# Patient Record
Sex: FEMALE | Race: OTHER | NOT HISPANIC OR LATINO | Employment: OTHER | URBAN - METROPOLITAN AREA
[De-identification: names, ages, dates, MRNs, and addresses within clinical notes are randomized per-mention and may not be internally consistent; named-entity substitution may affect disease eponyms.]

---

## 2023-12-27 ENCOUNTER — APPOINTMENT (OUTPATIENT)
Dept: CARDIOLOGY | Facility: HOSPITAL | Age: 73
End: 2023-12-27
Payer: MEDICARE

## 2023-12-27 ENCOUNTER — HOSPITAL ENCOUNTER (EMERGENCY)
Facility: HOSPITAL | Age: 73
Discharge: HOME | End: 2023-12-27
Payer: MEDICARE

## 2023-12-27 VITALS
DIASTOLIC BLOOD PRESSURE: 60 MMHG | BODY MASS INDEX: 32.1 KG/M2 | TEMPERATURE: 98.1 F | WEIGHT: 170 LBS | HEIGHT: 61 IN | RESPIRATION RATE: 18 BRPM | SYSTOLIC BLOOD PRESSURE: 152 MMHG | OXYGEN SATURATION: 100 % | HEART RATE: 58 BPM

## 2023-12-27 PROCEDURE — 93005 ELECTROCARDIOGRAM TRACING: CPT

## 2023-12-27 PROCEDURE — 99283 EMERGENCY DEPT VISIT LOW MDM: CPT

## 2023-12-27 PROCEDURE — 99281 EMR DPT VST MAYX REQ PHY/QHP: CPT | Mod: 25

## 2023-12-27 ASSESSMENT — PAIN - FUNCTIONAL ASSESSMENT: PAIN_FUNCTIONAL_ASSESSMENT: 0-10

## 2023-12-27 ASSESSMENT — PAIN SCALES - GENERAL: PAINLEVEL_OUTOF10: 0 - NO PAIN

## 2023-12-27 ASSESSMENT — COLUMBIA-SUICIDE SEVERITY RATING SCALE - C-SSRS
6. HAVE YOU EVER DONE ANYTHING, STARTED TO DO ANYTHING, OR PREPARED TO DO ANYTHING TO END YOUR LIFE?: NO
1. IN THE PAST MONTH, HAVE YOU WISHED YOU WERE DEAD OR WISHED YOU COULD GO TO SLEEP AND NOT WAKE UP?: NO
2. HAVE YOU ACTUALLY HAD ANY THOUGHTS OF KILLING YOURSELF?: NO

## 2024-01-08 LAB
ATRIAL RATE: 57 BPM
P AXIS: -1 DEGREES
P OFFSET: 178 MS
P ONSET: 142 MS
PR INTERVAL: 152 MS
Q ONSET: 218 MS
QRS COUNT: 10 BEATS
QRS DURATION: 74 MS
QT INTERVAL: 388 MS
QTC CALCULATION(BAZETT): 377 MS
QTC FREDERICIA: 381 MS
R AXIS: -13 DEGREES
T AXIS: 12 DEGREES
T OFFSET: 412 MS
VENTRICULAR RATE: 57 BPM

## 2024-04-25 ENCOUNTER — TELEPHONE (OUTPATIENT)
Dept: CARDIOLOGY | Facility: CLINIC | Age: 74
End: 2024-04-25
Payer: MEDICARE

## 2024-04-25 NOTE — TELEPHONE ENCOUNTER
Pt. Is a PRN and has not been seen since 10/21.   Her son wanted her to come in but she said she felt ok but wanted to be seen.  Please Advise

## 2024-05-09 ENCOUNTER — OFFICE VISIT (OUTPATIENT)
Dept: CARDIOLOGY | Facility: CLINIC | Age: 74
End: 2024-05-09
Payer: MEDICARE

## 2024-05-09 VITALS
SYSTOLIC BLOOD PRESSURE: 144 MMHG | HEART RATE: 56 BPM | HEIGHT: 60 IN | WEIGHT: 176.9 LBS | BODY MASS INDEX: 34.73 KG/M2 | DIASTOLIC BLOOD PRESSURE: 75 MMHG | TEMPERATURE: 98.1 F

## 2024-05-09 DIAGNOSIS — R06.09 DOE (DYSPNEA ON EXERTION): ICD-10-CM

## 2024-05-09 DIAGNOSIS — I10 ESSENTIAL HYPERTENSION: ICD-10-CM

## 2024-05-09 DIAGNOSIS — E78.2 MIXED HYPERLIPIDEMIA: ICD-10-CM

## 2024-05-09 DIAGNOSIS — R00.2 PALPITATIONS: Primary | ICD-10-CM

## 2024-05-09 PROBLEM — I49.3 FREQUENT PVCS: Status: ACTIVE | Noted: 2024-05-09

## 2024-05-09 PROBLEM — I48.0 PAF (PAROXYSMAL ATRIAL FIBRILLATION) (MULTI): Status: ACTIVE | Noted: 2024-05-09

## 2024-05-09 PROCEDURE — 3077F SYST BP >= 140 MM HG: CPT | Performed by: INTERNAL MEDICINE

## 2024-05-09 PROCEDURE — 99214 OFFICE O/P EST MOD 30 MIN: CPT | Performed by: INTERNAL MEDICINE

## 2024-05-09 PROCEDURE — 1159F MED LIST DOCD IN RCRD: CPT | Performed by: INTERNAL MEDICINE

## 2024-05-09 PROCEDURE — 1160F RVW MEDS BY RX/DR IN RCRD: CPT | Performed by: INTERNAL MEDICINE

## 2024-05-09 PROCEDURE — 3078F DIAST BP <80 MM HG: CPT | Performed by: INTERNAL MEDICINE

## 2024-05-09 RX ORDER — VITAMIN E MIXED 400 UNIT
400 CAPSULE ORAL
COMMUNITY

## 2024-05-09 RX ORDER — CHOLECALCIFEROL (VITAMIN D3) 50 MCG
TABLET ORAL
COMMUNITY

## 2024-05-09 RX ORDER — ROSUVASTATIN CALCIUM 5 MG/1
5 TABLET, COATED ORAL DAILY
COMMUNITY

## 2024-05-09 RX ORDER — LEVOTHYROXINE SODIUM 50 UG/1
50 TABLET ORAL
COMMUNITY

## 2024-05-09 RX ORDER — VALSARTAN 160 MG/1
160 TABLET ORAL DAILY
COMMUNITY

## 2024-05-09 NOTE — PROGRESS NOTES
Chief Complaint:   Palpitations     History of Present Illness     Christie Quintana is a 73 y.o. female presenting with PVC's.  She has a history of frequent PVC's and AF < 1 min on Holter. She has had rare palpitations which are brief.  No AF.  She exercises and denies chest pain.  While in [place], she had BORJAS - this has resolved.  No syncope.  BP has been controlled.       Previous History     Past Medical History:  She has a past medical history of Essential hypertension (05/09/2024), Frequent PVCs (05/09/2024), Mixed hyperlipidemia (05/09/2024), PAF (paroxysmal atrial fibrillation) (Multi) (05/09/2024), Palpitations (05/09/2024), Personal history of other diseases of the circulatory system, and Personal history of other diseases of the musculoskeletal system and connective tissue.    Past Surgical History:  She has no past surgical history on file.      Social History:  She has no history on file for tobacco use, alcohol use, and drug use.    Family History:  No family history on file.     Allergies:  Latex, natural rubber and Tramadol    Outpatient Medications:  Current Outpatient Medications   Medication Instructions    cholecalciferol (Vitamin D-3) 50 MCG (2000 UT) tablet oral    Lactobacillus acidophilus 10 billion cell capsule 1 capsule, oral, Daily RT    levothyroxine (SYNTHROID, LEVOXYL) 50 mcg, oral, Daily before breakfast    multivit 46-iron-folate 6-dha 29 mg iron-1 mg -300 mg capsule 1 capsule, oral, Daily RT    rosuvastatin (CRESTOR) 5 mg, oral, Daily    valsartan (DIOVAN) 160 mg, oral, Daily    vitamin E 400 Units, oral, Daily RT       Physical Examination   Vitals:  Visit Vitals  /75 (BP Location: Right arm, Patient Position: Sitting, BP Cuff Size: Adult)   Pulse 56   Temp 36.7 °C (98.1 °F)   Ht 1.524 m (5')   Wt 80.2 kg (176 lb 14.4 oz)   BMI 34.55 kg/m²   BSA 1.84 m²      Physical Exam  Vitals reviewed.   Constitutional:       General: She is not in acute distress.     Appearance: Normal  "appearance.   HENT:      Head: Normocephalic and atraumatic.      Nose: Nose normal.   Eyes:      Conjunctiva/sclera: Conjunctivae normal.   Cardiovascular:      Rate and Rhythm: Normal rate and regular rhythm.      Pulses: Normal pulses.      Heart sounds: No murmur heard.  Pulmonary:      Effort: Pulmonary effort is normal. No respiratory distress.      Breath sounds: Normal breath sounds. No wheezing, rhonchi or rales.   Abdominal:      General: Bowel sounds are normal. There is no distension.      Palpations: Abdomen is soft.      Tenderness: There is no abdominal tenderness.   Musculoskeletal:         General: No swelling.      Right lower leg: No edema.      Left lower leg: No edema.   Skin:     General: Skin is warm and dry.      Capillary Refill: Capillary refill takes less than 2 seconds.   Neurological:      General: No focal deficit present.      Mental Status: She is alert.   Psychiatric:         Mood and Affect: Mood normal.           Labs/Imaging/Cardiac Studies     Last Labs:  CBC -  No results found for: \"WBC\", \"HGB\", \"HCT\", \"MCV\", \"PLT\"    CMP -  No results found for: \"CALCIUM\", \"PHOS\", \"PROT\", \"ALBUMIN\", \"AST\", \"ALT\", \"ALKPHOS\", \"BILITOT\"    LIPID PANEL -   No results found for: \"CHOL\", \"HDL\", \"CHHDL\", \"LDL\", \"VLDL\", \"TRIG\", \"NHDL\"    RENAL FUNCTION PANEL -   No results found for: \"GLU\", \"K\", \"CHLOR\", \"PHOS\"    Lab Results   Component Value Date    HGBA1C 5.8 12/29/2023       ECG:    Echo:  No echocardiogram results found for the past 12 months       Assessment and Recommendations     Assessment/Plan     1. Palpitations  Stable, chronic with minimal Sx's.    2. Essential hypertension  The patient's blood pressure has been well-controlled at today's appointment or by recent primary care provider's measurements/home measurements and meets their goal blood pressure per the ACC/AHA guidelines.  The patient has been compliant with their anti-hypertensive medications and is following a low sodium/DASH " diet. I advised continuation of their present medical treatment and lifestyle modification.        3. Mixed hyperlipidemia  The patient's lipids are well controlled on chronic statin therapy and they are meeting their goal LDL cholesterol per the ACC/AHA guidelines.        4. BORJAS: Will check a stress echo.         Deny Alaniz MD    Exclusive of any other services or procedures performed, I, Deny Alaniz MD , spent 30 minutes in duration for this visit today.  This time consisted of chart review, obtaining history, and/or performing the exam as documented above as well as documenting the clinical information for the encounter in the electronic record, discussing treatment options, plans, and/or goals with patient, family, and/or caregiver, refilling medications, updating the electronic record, ordering medicines, lab work, imaging, referrals, and/or procedures as documented above and communicating with other [facility] professionals. I have discussed the results of laboratory, radiology, and cardiology studies with the patient and their family/caregiver.

## 2024-07-02 ENCOUNTER — APPOINTMENT (OUTPATIENT)
Dept: CARDIOLOGY | Facility: CLINIC | Age: 74
End: 2024-07-02
Payer: MEDICARE

## 2024-07-11 ENCOUNTER — APPOINTMENT (OUTPATIENT)
Dept: CARDIOLOGY | Facility: CLINIC | Age: 74
End: 2024-07-11
Payer: MEDICARE

## 2024-07-16 ENCOUNTER — TELEPHONE (OUTPATIENT)
Dept: CARDIOLOGY | Facility: CLINIC | Age: 74
End: 2024-07-16
Payer: MEDICARE

## 2024-07-18 ENCOUNTER — HOSPITAL ENCOUNTER (OUTPATIENT)
Dept: CARDIOLOGY | Facility: CLINIC | Age: 74
Discharge: HOME | End: 2024-07-18
Payer: MEDICARE

## 2024-07-18 DIAGNOSIS — R00.2 PALPITATIONS: ICD-10-CM

## 2024-07-18 DIAGNOSIS — E78.5 HYPERLIPIDEMIA, UNSPECIFIED: ICD-10-CM

## 2024-07-18 DIAGNOSIS — I10 ESSENTIAL HYPERTENSION: ICD-10-CM

## 2024-07-18 DIAGNOSIS — R06.09 DOE (DYSPNEA ON EXERTION): ICD-10-CM

## 2024-07-18 DIAGNOSIS — E78.2 MIXED HYPERLIPIDEMIA: ICD-10-CM

## 2024-07-18 DIAGNOSIS — R06.00 DYSPNEA, UNSPECIFIED: ICD-10-CM

## 2024-07-18 PROCEDURE — 93350 STRESS TTE ONLY: CPT | Performed by: INTERNAL MEDICINE

## 2024-07-18 PROCEDURE — 93017 CV STRESS TEST TRACING ONLY: CPT

## 2024-07-18 PROCEDURE — 93018 CV STRESS TEST I&R ONLY: CPT | Performed by: INTERNAL MEDICINE

## 2024-07-18 PROCEDURE — 93016 CV STRESS TEST SUPVJ ONLY: CPT | Performed by: INTERNAL MEDICINE

## 2025-05-21 ENCOUNTER — TELEPHONE (OUTPATIENT)
Dept: CARDIOLOGY | Facility: CLINIC | Age: 75
End: 2025-05-21
Payer: MEDICARE

## 2025-05-21 DIAGNOSIS — R06.09 DOE (DYSPNEA ON EXERTION): ICD-10-CM

## 2025-05-21 DIAGNOSIS — I48.0 PAF (PAROXYSMAL ATRIAL FIBRILLATION) (MULTI): Primary | ICD-10-CM

## 2025-05-22 ENCOUNTER — HOSPITAL ENCOUNTER (OUTPATIENT)
Dept: CARDIOLOGY | Facility: CLINIC | Age: 75
Discharge: HOME | End: 2025-05-22
Payer: MEDICARE

## 2025-05-22 ENCOUNTER — TELEPHONE (OUTPATIENT)
Dept: CARDIOLOGY | Facility: CLINIC | Age: 75
End: 2025-05-22
Payer: MEDICARE

## 2025-05-22 DIAGNOSIS — I48.0 PAF (PAROXYSMAL ATRIAL FIBRILLATION) (MULTI): ICD-10-CM

## 2025-05-22 PROCEDURE — 93246 EXT ECG>7D<15D RECORDING: CPT

## 2025-05-22 NOTE — TELEPHONE ENCOUNTER
Pt was given Amlodipine 2.5mg in the ER for BP, the bottle says take as needed, she stated the ER doc wants her to take it every day, she asked if her BP is still high in the evenings she should take extra?

## 2025-05-29 ENCOUNTER — TELEPHONE (OUTPATIENT)
Dept: CARDIOLOGY | Facility: CLINIC | Age: 75
End: 2025-05-29
Payer: MEDICARE

## 2025-05-29 RX ORDER — AMLODIPINE BESYLATE 2.5 MG/1
2.5 TABLET ORAL DAILY
COMMUNITY
Start: 2025-03-18

## 2025-05-29 NOTE — TELEPHONE ENCOUNTER
Pt scheduled Monday for stress echo, last July only achieved 81% due to treadmill speed. Ok to continue with stress echo?

## 2025-06-02 ENCOUNTER — APPOINTMENT (OUTPATIENT)
Dept: CARDIOLOGY | Facility: CLINIC | Age: 75
End: 2025-06-02
Payer: MEDICARE

## 2025-06-10 ENCOUNTER — OFFICE VISIT (OUTPATIENT)
Dept: CARDIOLOGY | Facility: CLINIC | Age: 75
End: 2025-06-10
Payer: MEDICARE

## 2025-06-10 ENCOUNTER — APPOINTMENT (OUTPATIENT)
Dept: CARDIOLOGY | Facility: CLINIC | Age: 75
End: 2025-06-10
Payer: MEDICARE

## 2025-06-10 ENCOUNTER — HOSPITAL ENCOUNTER (OUTPATIENT)
Dept: CARDIOLOGY | Facility: CLINIC | Age: 75
Discharge: HOME | End: 2025-06-10
Payer: MEDICARE

## 2025-06-10 VITALS
HEIGHT: 60 IN | BODY MASS INDEX: 33.96 KG/M2 | HEART RATE: 61 BPM | SYSTOLIC BLOOD PRESSURE: 142 MMHG | WEIGHT: 173 LBS | DIASTOLIC BLOOD PRESSURE: 78 MMHG

## 2025-06-10 VITALS — HEART RATE: 61 BPM | DIASTOLIC BLOOD PRESSURE: 78 MMHG | SYSTOLIC BLOOD PRESSURE: 142 MMHG

## 2025-06-10 DIAGNOSIS — I48.91 ATRIAL FIBRILLATION, UNSPECIFIED TYPE (MULTI): ICD-10-CM

## 2025-06-10 DIAGNOSIS — I48.0 PAF (PAROXYSMAL ATRIAL FIBRILLATION) (MULTI): ICD-10-CM

## 2025-06-10 DIAGNOSIS — R00.2 PALPITATIONS: Primary | ICD-10-CM

## 2025-06-10 DIAGNOSIS — E78.2 MIXED HYPERLIPIDEMIA: ICD-10-CM

## 2025-06-10 DIAGNOSIS — I49.3 FREQUENT PVCS: ICD-10-CM

## 2025-06-10 DIAGNOSIS — I10 ESSENTIAL HYPERTENSION: ICD-10-CM

## 2025-06-10 DIAGNOSIS — R06.00 DYSPNEA, UNSPECIFIED TYPE: ICD-10-CM

## 2025-06-10 PROCEDURE — 2500000004 HC RX 250 GENERAL PHARMACY W/ HCPCS (ALT 636 FOR OP/ED): Performed by: INTERNAL MEDICINE

## 2025-06-10 PROCEDURE — 78452 HT MUSCLE IMAGE SPECT MULT: CPT

## 2025-06-10 PROCEDURE — A9502 TC99M TETROFOSMIN: HCPCS | Performed by: INTERNAL MEDICINE

## 2025-06-10 PROCEDURE — 3430000001 HC RX 343 DIAGNOSTIC RADIOPHARMACEUTICALS: Performed by: INTERNAL MEDICINE

## 2025-06-10 PROCEDURE — 93017 CV STRESS TEST TRACING ONLY: CPT

## 2025-06-10 PROCEDURE — 99212 OFFICE O/P EST SF 10 MIN: CPT | Mod: 25 | Performed by: INTERNAL MEDICINE

## 2025-06-10 RX ORDER — REGADENOSON 0.08 MG/ML
0.4 INJECTION, SOLUTION INTRAVENOUS
Status: COMPLETED | OUTPATIENT
Start: 2025-06-10 | End: 2025-06-10

## 2025-06-10 RX ADMIN — TETROFOSMIN 10 MILLICURIE: 0.23 INJECTION, POWDER, LYOPHILIZED, FOR SOLUTION INTRAVENOUS at 12:32

## 2025-06-10 RX ADMIN — TETROFOSMIN 30 MILLICURIE: 0.23 INJECTION, POWDER, LYOPHILIZED, FOR SOLUTION INTRAVENOUS at 13:41

## 2025-06-10 RX ADMIN — REGADENOSON 0.4 MG: 0.08 INJECTION, SOLUTION INTRAVENOUS at 13:41

## 2025-06-10 NOTE — PROGRESS NOTES
Chief Complaint:   Palpitations     History of Present Illness     Christie Quintana is a 74 y.o. female presenting with PVC's.  She has a history of frequent PVC's and AF < 1 min on Holter. She has had rare palpitations which are brief.  No AF.  She exercises and denies chest pain.  While in [place], she had BORJAS - this has resolved.  No syncope.  BP has been controlled.  Since here last year, in ED 5/21/25 after awakening with heart pounding with Apple Watch 's no AF.  Resolved in 2 minutes and went to ED.  Evaluation unremarkable, including ECG NSR.  Felt different than her prior Hx of PVC's. Denies CP or BORJAS.  Fatigued in evening.       Previous History     Past Medical History:  She has a past medical history of BORJAS (dyspnea on exertion) (05/09/2024), Essential hypertension (05/09/2024), Frequent PVCs (05/09/2024), Mixed hyperlipidemia (05/09/2024), PAF (paroxysmal atrial fibrillation) (Multi) (05/09/2024), Palpitations (05/09/2024), Personal history of other diseases of the circulatory system, and Personal history of other diseases of the musculoskeletal system and connective tissue.    Past Surgical History:  She has no past surgical history on file.      Social History:  She has no history on file for tobacco use, alcohol use, and drug use.    Family History:  No family history on file.     Allergies:  Latex, natural rubber and Tramadol    Outpatient Medications:  Outpatient Medications:  Current Outpatient Medications   Medication Instructions    amLODIPine (NORVASC) 2.5 mg, Daily    cholecalciferol (Vitamin D-3) 50 MCG (2000 UT) tablet oral    Lactobacillus acidophilus 10 billion cell capsule 1 capsule, oral, Daily RT    levothyroxine (SYNTHROID, LEVOXYL) 50 mcg, oral, Daily before breakfast    multivit 46-iron-folate 6-dha 29 mg iron-1 mg -300 mg capsule 1 capsule, oral, Daily RT    rosuvastatin (CRESTOR) 5 mg, oral, Daily    valsartan (DIOVAN) 160 mg, oral, Daily    vitamin E 400 Units, oral, Daily RT  "        Physical Examination   Vitals:  Visit Vitals  /78 (BP Location: Right arm, Patient Position: Sitting, BP Cuff Size: Adult)   Pulse 61   Ht 1.524 m (5')   Wt 78.5 kg (173 lb)   BMI 33.79 kg/m²   BSA 1.82 m²       There were no vitals taken for this visit.     Physical Exam  Vitals reviewed.   Constitutional:       General: She is not in acute distress.     Appearance: Normal appearance.   HENT:      Head: Normocephalic and atraumatic.      Nose: Nose normal.   Eyes:      Conjunctiva/sclera: Conjunctivae normal.   Cardiovascular:      Rate and Rhythm: Normal rate and regular rhythm.      Pulses: Normal pulses.      Heart sounds: No murmur heard.  Pulmonary:      Effort: Pulmonary effort is normal. No respiratory distress.      Breath sounds: Normal breath sounds. No wheezing, rhonchi or rales.   Abdominal:      General: Bowel sounds are normal. There is no distension.      Palpations: Abdomen is soft.      Tenderness: There is no abdominal tenderness.   Musculoskeletal:         General: No swelling.      Right lower leg: No edema.      Left lower leg: No edema.   Skin:     General: Skin is warm and dry.      Capillary Refill: Capillary refill takes less than 2 seconds.   Neurological:      General: No focal deficit present.      Mental Status: She is alert.   Psychiatric:         Mood and Affect: Mood normal.           Labs/Imaging/Cardiac Studies     Last Labs:  CBC -  No results found for: \"WBC\", \"HGB\", \"HCT\", \"MCV\", \"PLT\"    CMP -  No results found for: \"CALCIUM\", \"PHOS\", \"PROT\", \"ALBUMIN\", \"AST\", \"ALT\", \"ALKPHOS\", \"BILITOT\"    LIPID PANEL -   No results found for: \"CHOL\", \"HDL\", \"CHHDL\", \"LDL\", \"VLDL\", \"TRIG\", \"NHDL\"    RENAL FUNCTION PANEL -   No results found for: \"GLU\", \"K\", \"CHLOR\", \"PHOS\"    Lab Results   Component Value Date    HGBA1C 5.3 03/18/2025       Reviewed ECG, Stress MPI, Labs, ED notes  2 week Holter pending (ASX)    Echo:  Echocardiogram Stress Test  Result Date: 7/19/2024        " [facility] [place], [place] [address]        Tel [phone] and Fax [phone] Exercise Stress Echo Patient Name:      MARITZA LIU          Ordering Provider:   [id number] JAZMIN ALANIZ Study Date:        7/18/2024           Reading Physician:   Zhang Alaniz MD MRN/PID:           [id number]            Supervising                                        Physician: Accession#:        [id number]        Fellow: Date of Birth/Age: 1950 / 73      Fellow:                    years Gender:            F                   Nurse:               Ashli Izaguirre RN Admit Date:        7/18/2024           Technician:          Sydnie Euceda CVT Admission Status:  Outpatient          Sonographer:         Kalina SOLIS RDCS Height:            152.40 cm           Technologist: Weight:            78.47 kg            Additional Staff: BSA:               1.76 m2             Encounter#:          [id number] BMI:               33.79 kg/m2         Patient Location:    [facility] Study Type:    ECHOCARDIOGRAM STRESS TEST Diagnosis/ICD: Dyspnea, unspecified-R06.00; Palpitations-R00.2; Essential                (primary) hypertension-I10; Hyperlipidemia unspecified-E78.5 Indication:    Dyspnea CPT Code: Falls Risk:  Study Details: Correct procedure and correct patient verified verbally.  Patient History: Dyspnea, dyslipidemia and hypertension. Allergies: Latex,tramadol. Smoker:    Never. Diabetes:  No.  Medications: The patient's prescribed medication is multivitamin,levothyroxine,vit d3,rosuvastatin,valsartan.  Patient Performance: The patient exercised to stage II on a Sam protocol for 3 minutes and 10 seconds, achieving 4.8 METS. The peak heart rate achieved was 120 bpm, which was 82 % of the age predicted target heart rate of 146 bpm. The resting blood pressure was 149/80  mmHg with a heart rate of 55 bpm. The patient's functional capacity was average. The patient developed shortness of breath during the stress exam. The symptoms resolved with rest. The blood pressure response was normal. The test was terminated due to: fatigue and dyspnea. Patient has met the discharge criteria and is discharged to home.  Baseline ECG: Resting ECG showed sinus bradycardia.  Stress ECG: Stress ECG showed normal sinus rhythm, with no abnormal findings. No ST changes.  Stress Stage Data: +----------------+---+------+-------+--------+                 HR Sys BPDias BPComments +----------------+---+------+-------+--------+ Baseline [id number]   80              +----------------+---+------+-------+--------+ start                                    +----------------+---+------+-------+--------+ Stage I         [id number]   78     sob      +----------------+---+------+-------+--------+ Stage II        120             end 3:10 +----------------+---+------+-------+--------+  Recovery ECG: Recovery ECG showed normal sinus rhythm, with Rare PAC. The heart rate recovery was normal.  +------------+--+------+-------+             HRSys BPDias BP +------------+--+------+-------+ Recovery I  [id number]   76      +------------+--+------+-------+ Recovery II [id number]   80      +------------+--+------+-------+ Recovery [id number]   82      +------------+--+------+-------+  Baseline Echo: Normal left ventricular size and function. There is mild asymmetric left ventricular hypertrophy. The resting baseline ejection fraction was estimated at 55 to 60%. There are no regional wall motion abnormalities at baseline. Spectral Doppler shows an impaired relaxation pattern of LV diastolic filling.  Stress Echo: Normal left ventricular size and function during peak stress. There are no stress induced regional wall motion abnormalities. The ejection fraction is approximately 70 to 75% at peak  stress.  Aortic Valve: Resting AoV Vmax:      1.54 m/s AoV Peak Grad: 9.5 mmHg  Tricuspid Valve: Resting TR Vmax:      2.91 m/s TR Peak Grad: 33.8 mmHg RVSP:         38.8 mmHg  Summary:  1. The resting ejection fraction was estimated at 55 to 60% with a peak exercise ejection fraction estimated at 70 to 75%.  2. Adequate level of stress achieved.  3. No clinical, echocardiographic or electrocardiographic evidence for ischemia at a maximal workload.  4. Normal ECG.  5. Normal Stress Test. [id number] Deny Alaniz MD Electronically signed on 7/19/2024 at 9:56:05 AM   ** Final **          Assessment and Recommendations     Assessment/Plan     1. Palpitations  Suspicious for AF (had <1 min AF on prior Holter).  2-week Holter should be back soon.    2. Essential hypertension  The patient's blood pressure has been well-controlled at today's appointment or by recent primary care provider's measurements/home measurements and meets their goal blood pressure per the ACC/AHA guidelines.  The patient has been compliant with their anti-hypertensive medications and is following a low sodium/DASH diet. I advised continuation of their present medical treatment and lifestyle modification.      3. Mixed hyperlipidemia  The patient's lipids are well controlled on chronic rosuvastatin therapy and they are meeting their goal LDL cholesterol per the ACC/AHA guidelines.      4. PVC's  Resolved.  Will follow Holter.         Deny Alaniz MD    Exclusive of any other services or procedures performed, I, Deny Alaniz MD , spent 30 minutes in duration for this visit today.  This time consisted of chart review, obtaining history, and/or performing the exam as documented above as well as documenting the clinical information for the encounter in the electronic record, discussing treatment options, plans, and/or goals with patient, family, and/or caregiver, refilling medications, updating the electronic record, ordering medicines, lab work, imaging,  referrals, and/or procedures as documented above and communicating with other [facility] professionals. I have discussed the results of laboratory, radiology, and cardiology studies with the patient and their family/caregiver.

## 2025-06-11 ENCOUNTER — TELEPHONE (OUTPATIENT)
Dept: CARDIOLOGY | Facility: CLINIC | Age: 75
End: 2025-06-11
Payer: MEDICARE

## 2025-06-11 DIAGNOSIS — I48.0 PAF (PAROXYSMAL ATRIAL FIBRILLATION) (MULTI): Primary | ICD-10-CM

## 2025-08-11 ENCOUNTER — OFFICE VISIT (OUTPATIENT)
Dept: CARDIOLOGY | Facility: CLINIC | Age: 75
End: 2025-08-11
Payer: MEDICARE

## 2025-08-11 VITALS
WEIGHT: 174 LBS | DIASTOLIC BLOOD PRESSURE: 78 MMHG | BODY MASS INDEX: 32.85 KG/M2 | HEIGHT: 61 IN | HEART RATE: 61 BPM | SYSTOLIC BLOOD PRESSURE: 132 MMHG

## 2025-08-11 DIAGNOSIS — I48.0 PAF (PAROXYSMAL ATRIAL FIBRILLATION) (MULTI): Primary | ICD-10-CM

## 2025-08-11 PROCEDURE — 1036F TOBACCO NON-USER: CPT | Performed by: INTERNAL MEDICINE

## 2025-08-11 PROCEDURE — 93005 ELECTROCARDIOGRAM TRACING: CPT | Performed by: INTERNAL MEDICINE

## 2025-08-11 PROCEDURE — 1160F RVW MEDS BY RX/DR IN RCRD: CPT | Performed by: INTERNAL MEDICINE

## 2025-08-11 PROCEDURE — 99212 OFFICE O/P EST SF 10 MIN: CPT

## 2025-08-11 PROCEDURE — 99204 OFFICE O/P NEW MOD 45 MIN: CPT | Performed by: INTERNAL MEDICINE

## 2025-08-11 PROCEDURE — 3078F DIAST BP <80 MM HG: CPT | Performed by: INTERNAL MEDICINE

## 2025-08-11 PROCEDURE — 3008F BODY MASS INDEX DOCD: CPT | Performed by: INTERNAL MEDICINE

## 2025-08-11 PROCEDURE — 1159F MED LIST DOCD IN RCRD: CPT | Performed by: INTERNAL MEDICINE

## 2025-08-11 PROCEDURE — 3075F SYST BP GE 130 - 139MM HG: CPT | Performed by: INTERNAL MEDICINE

## 2025-08-11 RX ORDER — HYDROCORTISONE 25 MG/G
CREAM TOPICAL
COMMUNITY
Start: 2025-06-03

## 2025-08-11 ASSESSMENT — ENCOUNTER SYMPTOMS
MUSCULOSKELETAL NEGATIVE: 1
EYES NEGATIVE: 1
RESPIRATORY NEGATIVE: 1
NEUROLOGICAL NEGATIVE: 1
IRREGULAR HEARTBEAT: 1
PALPITATIONS: 1
PSYCHIATRIC NEGATIVE: 1
DYSPNEA ON EXERTION: 1
ENDOCRINE NEGATIVE: 1
CONSTITUTIONAL NEGATIVE: 1
GASTROINTESTINAL NEGATIVE: 1

## 2025-08-13 ENCOUNTER — APPOINTMENT (OUTPATIENT)
Dept: CARDIOLOGY | Facility: CLINIC | Age: 75
End: 2025-08-13
Payer: MEDICARE

## 2025-08-13 DIAGNOSIS — I48.0 PAF (PAROXYSMAL ATRIAL FIBRILLATION) (MULTI): Primary | ICD-10-CM

## 2025-08-13 DIAGNOSIS — R00.2 PALPITATIONS: ICD-10-CM

## 2025-08-14 LAB
ATRIAL RATE: 59 BPM
P AXIS: 13 DEGREES
P OFFSET: 178 MS
P ONSET: 134 MS
PR INTERVAL: 172 MS
Q ONSET: 220 MS
QRS COUNT: 10 BEATS
QRS DURATION: 74 MS
QT INTERVAL: 394 MS
QTC CALCULATION(BAZETT): 390 MS
QTC FREDERICIA: 392 MS
R AXIS: -9 DEGREES
T AXIS: 26 DEGREES
T OFFSET: 417 MS
VENTRICULAR RATE: 59 BPM